# Patient Record
Sex: MALE | Race: WHITE
[De-identification: names, ages, dates, MRNs, and addresses within clinical notes are randomized per-mention and may not be internally consistent; named-entity substitution may affect disease eponyms.]

---

## 2019-04-29 ENCOUNTER — HOSPITAL ENCOUNTER (OUTPATIENT)
Dept: HOSPITAL 15 - RAD HDHVI | Age: 84
Discharge: HOME | End: 2019-04-29
Attending: INTERNAL MEDICINE
Payer: COMMERCIAL

## 2019-04-29 VITALS — DIASTOLIC BLOOD PRESSURE: 61 MMHG | SYSTOLIC BLOOD PRESSURE: 146 MMHG

## 2019-04-29 VITALS — DIASTOLIC BLOOD PRESSURE: 89 MMHG | SYSTOLIC BLOOD PRESSURE: 149 MMHG

## 2019-04-29 DIAGNOSIS — E04.1: ICD-10-CM

## 2019-04-29 DIAGNOSIS — J98.11: ICD-10-CM

## 2019-04-29 DIAGNOSIS — I25.10: Primary | ICD-10-CM

## 2019-04-29 DIAGNOSIS — I11.9: ICD-10-CM

## 2019-04-29 DIAGNOSIS — C49.A0: ICD-10-CM

## 2019-04-29 DIAGNOSIS — J90: ICD-10-CM

## 2019-04-29 PROCEDURE — 36415 COLL VENOUS BLD VENIPUNCTURE: CPT

## 2019-04-29 PROCEDURE — 71260 CT THORAX DX C+: CPT

## 2019-04-29 PROCEDURE — 82565 ASSAY OF CREATININE: CPT

## 2019-04-29 NOTE — NUR
IV removal

IV DC'd with sterile technique, catheter fully intact. Pressure dressing applied to site. 
Patient tolerated procedure well.  Discharged with aftercare instructions per MD.

NOTE:

## 2019-04-29 NOTE — NUR
IV insertion

IV access obtained, via clean sterile technique by inserting  gauge catheter at  after  
attempt(s). IV secured properly. No trauma to site. Patient tolerated procedure well.

## 2019-04-29 NOTE — NUR
Discharge Instructions

See e-MAR for any mediations given with this visit.  Patient education given on disease 
process. Patient verbalized understanding.  Previous labs reviewed.  Patient discharged in 
stable condition with after care instructions and follow up appointment.



MEDICATION



1415  ML IV X 1

## 2019-04-29 NOTE — NUR
[facility]

PT ARRIVED AT [facility] FOR [facility]A CHEST.ABD R/O POSSIBLE CA POST HOSPITALIZATION WITH PLEURAL 
EFFUSIONS.  LABS DRAWN, V/S OBTAINED , MD UPDATED ORDERS RECEIVED AND NOTED.

## 2020-06-11 ENCOUNTER — HOSPITAL ENCOUNTER (INPATIENT)
Dept: HOSPITAL 15 - ER | Age: 85
LOS: 7 days | Discharge: HOSPICE | DRG: 871 | End: 2020-06-18
Attending: INTERNAL MEDICINE | Admitting: INTERNAL MEDICINE
Payer: COMMERCIAL

## 2020-06-11 VITALS — WEIGHT: 199.52 LBS | BODY MASS INDEX: 27.02 KG/M2 | HEIGHT: 72 IN

## 2020-06-11 DIAGNOSIS — Z85.00: ICD-10-CM

## 2020-06-11 DIAGNOSIS — D64.9: ICD-10-CM

## 2020-06-11 DIAGNOSIS — I13.0: ICD-10-CM

## 2020-06-11 DIAGNOSIS — G93.41: ICD-10-CM

## 2020-06-11 DIAGNOSIS — I73.9: ICD-10-CM

## 2020-06-11 DIAGNOSIS — Z66: ICD-10-CM

## 2020-06-11 DIAGNOSIS — Z79.899: ICD-10-CM

## 2020-06-11 DIAGNOSIS — N17.9: ICD-10-CM

## 2020-06-11 DIAGNOSIS — Z91.041: ICD-10-CM

## 2020-06-11 DIAGNOSIS — E16.2: ICD-10-CM

## 2020-06-11 DIAGNOSIS — D47.3: ICD-10-CM

## 2020-06-11 DIAGNOSIS — Z51.5: ICD-10-CM

## 2020-06-11 DIAGNOSIS — N18.9: ICD-10-CM

## 2020-06-11 DIAGNOSIS — J18.9: ICD-10-CM

## 2020-06-11 DIAGNOSIS — A41.9: Primary | ICD-10-CM

## 2020-06-11 DIAGNOSIS — I50.32: ICD-10-CM

## 2020-06-11 DIAGNOSIS — K56.7: ICD-10-CM

## 2020-06-11 DIAGNOSIS — E43: ICD-10-CM

## 2020-06-11 LAB
ALBUMIN SERPL-MCNC: 1.5 G/DL (ref 3.4–5)
ALP SERPL-CCNC: 119 U/L (ref 45–117)
ALT SERPL-CCNC: < 6 U/L (ref 16–61)
ANION GAP SERPL CALCULATED.3IONS-SCNC: 12 MMOL/L (ref 5–15)
APTT PPP: 25.9 SEC (ref 23.64–32.05)
BILIRUB SERPL-MCNC: 0.3 MG/DL (ref 0.2–1)
BUN SERPL-MCNC: 59 MG/DL (ref 7–18)
BUN/CREAT SERPL: 29.6
CALCIUM SERPL-MCNC: 8.1 MG/DL (ref 8.5–10.1)
CHLORIDE SERPL-SCNC: 110 MMOL/L (ref 98–107)
CO2 SERPL-SCNC: 15 MMOL/L (ref 21–32)
CRYSTALS UR QL AUTO: (no result) /HPF
GLUCOSE SERPL-MCNC: 71 MG/DL (ref 74–106)
HCT VFR BLD AUTO: 29.2 % (ref 41–53)
HGB BLD-MCNC: 9.1 G/DL (ref 13.5–17.5)
INR PPP: 1.19 (ref 0.9–1.15)
MCH RBC QN AUTO: 25 PG (ref 28–32)
MCV RBC AUTO: 79.7 FL (ref 80–100)
NRBC BLD QL AUTO: 0.1 %
POTASSIUM SERPL-SCNC: 5 MMOL/L (ref 3.5–5.1)
PROT SERPL-MCNC: 5.1 G/DL (ref 6.4–8.2)
SODIUM SERPL-SCNC: 137 MMOL/L (ref 136–145)

## 2020-06-11 PROCEDURE — 74176 CT ABD & PELVIS W/O CONTRAST: CPT

## 2020-06-11 PROCEDURE — 83036 HEMOGLOBIN GLYCOSYLATED A1C: CPT

## 2020-06-11 PROCEDURE — 87493 C DIFF AMPLIFIED PROBE: CPT

## 2020-06-11 PROCEDURE — 85027 COMPLETE CBC AUTOMATED: CPT

## 2020-06-11 PROCEDURE — 83605 ASSAY OF LACTIC ACID: CPT

## 2020-06-11 PROCEDURE — 83735 ASSAY OF MAGNESIUM: CPT

## 2020-06-11 PROCEDURE — 87045 FECES CULTURE AEROBIC BACT: CPT

## 2020-06-11 PROCEDURE — 71250 CT THORAX DX C-: CPT

## 2020-06-11 PROCEDURE — 84443 ASSAY THYROID STIM HORMONE: CPT

## 2020-06-11 PROCEDURE — 85007 BL SMEAR W/DIFF WBC COUNT: CPT

## 2020-06-11 PROCEDURE — 87086 URINE CULTURE/COLONY COUNT: CPT

## 2020-06-11 PROCEDURE — 83880 ASSAY OF NATRIURETIC PEPTIDE: CPT

## 2020-06-11 PROCEDURE — 87081 CULTURE SCREEN ONLY: CPT

## 2020-06-11 PROCEDURE — 83540 ASSAY OF IRON: CPT

## 2020-06-11 PROCEDURE — 71045 X-RAY EXAM CHEST 1 VIEW: CPT

## 2020-06-11 PROCEDURE — 74018 RADEX ABDOMEN 1 VIEW: CPT

## 2020-06-11 PROCEDURE — 93005 ELECTROCARDIOGRAM TRACING: CPT

## 2020-06-11 PROCEDURE — 87040 BLOOD CULTURE FOR BACTERIA: CPT

## 2020-06-11 PROCEDURE — 84484 ASSAY OF TROPONIN QUANT: CPT

## 2020-06-11 PROCEDURE — 85730 THROMBOPLASTIN TIME PARTIAL: CPT

## 2020-06-11 PROCEDURE — 36415 COLL VENOUS BLD VENIPUNCTURE: CPT

## 2020-06-11 PROCEDURE — 85610 PROTHROMBIN TIME: CPT

## 2020-06-11 PROCEDURE — 85045 AUTOMATED RETICULOCYTE COUNT: CPT

## 2020-06-11 PROCEDURE — 82805 BLOOD GASES W/O2 SATURATION: CPT

## 2020-06-11 PROCEDURE — 80048 BASIC METABOLIC PNL TOTAL CA: CPT

## 2020-06-11 PROCEDURE — 36600 WITHDRAWAL OF ARTERIAL BLOOD: CPT

## 2020-06-11 PROCEDURE — 80053 COMPREHEN METABOLIC PANEL: CPT

## 2020-06-11 PROCEDURE — 81001 URINALYSIS AUTO W/SCOPE: CPT

## 2020-06-11 PROCEDURE — 83550 IRON BINDING TEST: CPT

## 2020-06-11 PROCEDURE — 87427 SHIGA-LIKE TOXIN AG IA: CPT

## 2020-06-11 PROCEDURE — 82962 GLUCOSE BLOOD TEST: CPT

## 2020-06-11 PROCEDURE — 80202 ASSAY OF VANCOMYCIN: CPT

## 2020-06-11 PROCEDURE — 73060 X-RAY EXAM OF HUMERUS: CPT

## 2020-06-11 PROCEDURE — 85025 COMPLETE CBC W/AUTO DIFF WBC: CPT

## 2020-06-11 RX ADMIN — Medication SCH STRIP: at 22:20

## 2020-06-11 RX ADMIN — PIPERACILLIN SODIUM AND TAZOBACTAM SODIUM SCH MLS/HR: .5; 4 INJECTION, POWDER, LYOPHILIZED, FOR SOLUTION INTRAVENOUS at 22:20

## 2020-06-11 RX ADMIN — Medication SCH STRIP: at 20:58

## 2020-06-12 VITALS — SYSTOLIC BLOOD PRESSURE: 120 MMHG | DIASTOLIC BLOOD PRESSURE: 54 MMHG

## 2020-06-12 VITALS — DIASTOLIC BLOOD PRESSURE: 56 MMHG | SYSTOLIC BLOOD PRESSURE: 102 MMHG

## 2020-06-12 VITALS — DIASTOLIC BLOOD PRESSURE: 54 MMHG | SYSTOLIC BLOOD PRESSURE: 120 MMHG

## 2020-06-12 LAB
ALBUMIN SERPL-MCNC: 1.2 G/DL (ref 3.4–5)
ALP SERPL-CCNC: 96 U/L (ref 45–117)
ALT SERPL-CCNC: < 6 U/L (ref 16–61)
ANION GAP SERPL CALCULATED.3IONS-SCNC: 9 MMOL/L (ref 5–15)
BILIRUB SERPL-MCNC: 0.2 MG/DL (ref 0.2–1)
BUN SERPL-MCNC: 52 MG/DL (ref 7–18)
BUN/CREAT SERPL: 26.8
CALCIUM SERPL-MCNC: 7.4 MG/DL (ref 8.5–10.1)
CHLORIDE SERPL-SCNC: 111 MMOL/L (ref 98–107)
CO2 SERPL-SCNC: 17 MMOL/L (ref 21–32)
GLUCOSE SERPL-MCNC: 122 MG/DL (ref 74–106)
HCT VFR BLD AUTO: 25.5 % (ref 41–53)
HGB BLD-MCNC: 8.3 G/DL (ref 13.5–17.5)
MCH RBC QN AUTO: 25.5 PG (ref 28–32)
MCV RBC AUTO: 78.6 FL (ref 80–100)
POTASSIUM SERPL-SCNC: 4.5 MMOL/L (ref 3.5–5.1)
PROT SERPL-MCNC: 4.6 G/DL (ref 6.4–8.2)
SODIUM SERPL-SCNC: 137 MMOL/L (ref 136–145)

## 2020-06-12 RX ADMIN — Medication SCH STRIP: at 00:06

## 2020-06-12 RX ADMIN — PIPERACILLIN SODIUM AND TAZOBACTAM SODIUM SCH MLS/HR: .5; 4 INJECTION, POWDER, LYOPHILIZED, FOR SOLUTION INTRAVENOUS at 22:14

## 2020-06-12 RX ADMIN — Medication SCH STRIP: at 06:04

## 2020-06-12 RX ADMIN — Medication SCH STRIP: at 06:00

## 2020-06-12 RX ADMIN — Medication SCH STRIP: at 22:00

## 2020-06-12 RX ADMIN — Medication SCH STRIP: at 12:08

## 2020-06-12 RX ADMIN — Medication SCH STRIP: at 04:44

## 2020-06-12 RX ADMIN — Medication SCH STRIP: at 18:13

## 2020-06-12 RX ADMIN — PIPERACILLIN SODIUM AND TAZOBACTAM SODIUM SCH MLS/HR: .5; 4 INJECTION, POWDER, LYOPHILIZED, FOR SOLUTION INTRAVENOUS at 06:04

## 2020-06-12 RX ADMIN — Medication SCH STRIP: at 02:39

## 2020-06-12 RX ADMIN — PIPERACILLIN SODIUM AND TAZOBACTAM SODIUM SCH MLS/HR: .5; 4 INJECTION, POWDER, LYOPHILIZED, FOR SOLUTION INTRAVENOUS at 15:49

## 2020-06-12 NOTE — NUR
WOUND CARE NOTE:

Wound care in to see patient per wound care request regarding multiple wounds/skin integrity 
issue that are noted present on admission.  Patient is 85 y/o male with admitting diagnosis 
of Sepsis, Septic Shock. Patient is resting in bed in Rm. 246A.  Patient is awake, alert and 
oriented to self. He's max assist in turning and repositioning and his Nicanor score is 12. 
Patient is in no stated pain at this time and he appears to be in no pain using Stewart Baker 
Faces Pain Scale. 



Skin assessment done with the assistance of patient's nurse, DORON Hector. Noted patient's  BL 
and BLE are edematous. Multiple non-blanchable redness (Stage 1 pressure injury noted) on 
patient's distal L medial foot, Rt heel,  Lt elbow, RT and Lt sacrum.  Patient is also 
incontinent of stool and passed moderate amount of loose stools. Maribel care given. Noted 
redness to his buttock, perirectal, perineum and inner thighs consistent with moisture 
associated dermatitis. Applied Z Guard cream to sacral, buttocks and perineum.  Complete 
linen changed. 



Intact erythema to edematous Lt lower leg also noted. Patient's Rt elbow noted with thick 
brown hyperpigmented, surrounding skin is pink, clean and dry, left open to air.  Intact 
ecchymosis noted on patient's posterior Rt upper arm and Lt forearm, no drainage/odor noted, 
left open to air. Photograph of patient's wounds/skin issue are taken for reference.  
Patient tolerated well. Repositioned patient for comfort. DORON Hector at bedside.



RECOMMENDATION:

Nursing to continue BID/PRN cleaning and application  of Z Guard cream to sacral, buttocks 
and perineum per MD order, Dietary consult,frequent turning and repositioning schedule as 
condition permits,  redistribute pressure points with pillows; air mattress (ordered), 
elevate heels on pillows; frequent mariebl care/check, keep clean and dry, continue monitoring 
by wound care while patient is hospitalized.

-------------------------------------------------------------------------------

Addendum: 06/12/20 at 1749 by Patrizia Felipe RN

-------------------------------------------------------------------------------

Amended: Links added.

## 2020-06-12 NOTE — NUR
Opening Shift Note

Assumed care of patient, lethargic, easily arousable. NGT to LIS.  No S/S of distress/SOB or 
pain.  Instructed on POC and to call for assist PRN, will continue to monitor for changes 
Q1hr and PRN.

## 2020-06-12 NOTE — NUR
Telemetry admit from ER

OLIVERIOEYAD admitted to Telemetry unit after SBAR received.  Patient oriented to hollie ARMSTRONG RN, unit, room, bed, and unit policies regarding patient care and visiting hours. 
Patient now on continuous telemetry monitoring, tele box #7  and telemetry reading on 
arrival to unit is Sinus Rhythm 98bpm. Patient placed on bedside oxygen, weighed by bedscale 
and encouraged to call if they need something. All questions and concerns addressed, patient 
verbalized understanding.

## 2020-06-12 NOTE — NUR
AIR MATTRESS:

Air mattress ordered at [facility]. JEFFY 06/12/20 @ 2317. Reference #[id number]. Please call Anjel Mahan at [phone] if needed to follow up.

-------------------------------------------------------------------------------

Addendum: 06/12/20 at 1723 by Patrizia Felipe RN

-------------------------------------------------------------------------------

Amended: Links added.

## 2020-06-13 VITALS — SYSTOLIC BLOOD PRESSURE: 148 MMHG | DIASTOLIC BLOOD PRESSURE: 73 MMHG

## 2020-06-13 VITALS — SYSTOLIC BLOOD PRESSURE: 124 MMHG | DIASTOLIC BLOOD PRESSURE: 81 MMHG

## 2020-06-13 VITALS — SYSTOLIC BLOOD PRESSURE: 152 MMHG | DIASTOLIC BLOOD PRESSURE: 70 MMHG

## 2020-06-13 VITALS — DIASTOLIC BLOOD PRESSURE: 86 MMHG | SYSTOLIC BLOOD PRESSURE: 130 MMHG

## 2020-06-13 VITALS — DIASTOLIC BLOOD PRESSURE: 62 MMHG | SYSTOLIC BLOOD PRESSURE: 137 MMHG

## 2020-06-13 LAB
ALBUMIN SERPL-MCNC: 1.3 G/DL (ref 3.4–5)
ALP SERPL-CCNC: 97 U/L (ref 45–117)
ALT SERPL-CCNC: < 6 U/L (ref 16–61)
ANION GAP SERPL CALCULATED.3IONS-SCNC: 8 MMOL/L (ref 5–15)
BILIRUB SERPL-MCNC: 0.2 MG/DL (ref 0.2–1)
BUN SERPL-MCNC: 51 MG/DL (ref 7–18)
BUN/CREAT SERPL: 26.3
CALCIUM SERPL-MCNC: 7.5 MG/DL (ref 8.5–10.1)
CHLORIDE SERPL-SCNC: 115 MMOL/L (ref 98–107)
CO2 SERPL-SCNC: 17 MMOL/L (ref 21–32)
GLUCOSE SERPL-MCNC: 82 MG/DL (ref 74–106)
HCT VFR BLD AUTO: 27.9 % (ref 41–53)
HGB BLD-MCNC: 8.5 G/DL (ref 13.5–17.5)
MAGNESIUM SERPL-MCNC: 2.4 MG/DL (ref 1.6–2.6)
MCH RBC QN AUTO: 25.8 PG (ref 28–32)
MCV RBC AUTO: 85.1 FL (ref 80–100)
POTASSIUM SERPL-SCNC: 4.4 MMOL/L (ref 3.5–5.1)
PROT SERPL-MCNC: 4.9 G/DL (ref 6.4–8.2)
SODIUM SERPL-SCNC: 140 MMOL/L (ref 136–145)

## 2020-06-13 RX ADMIN — Medication SCH STRIP: at 18:14

## 2020-06-13 RX ADMIN — Medication SCH STRIP: at 12:09

## 2020-06-13 RX ADMIN — PIPERACILLIN SODIUM AND TAZOBACTAM SODIUM SCH MLS/HR: .5; 4 INJECTION, POWDER, LYOPHILIZED, FOR SOLUTION INTRAVENOUS at 14:08

## 2020-06-13 RX ADMIN — MORPHINE SULFATE PRN MG: 2 INJECTION, SOLUTION INTRAMUSCULAR; INTRAVENOUS at 21:33

## 2020-06-13 RX ADMIN — PIPERACILLIN SODIUM AND TAZOBACTAM SODIUM SCH MLS/HR: .5; 4 INJECTION, POWDER, LYOPHILIZED, FOR SOLUTION INTRAVENOUS at 05:42

## 2020-06-13 RX ADMIN — Medication SCH STRIP: at 05:42

## 2020-06-13 RX ADMIN — PIPERACILLIN SODIUM AND TAZOBACTAM SODIUM SCH MLS/HR: .375; 3 INJECTION, POWDER, LYOPHILIZED, FOR SOLUTION INTRAVENOUS at 20:27

## 2020-06-13 RX ADMIN — Medication SCH STRIP: at 21:33

## 2020-06-13 RX ADMIN — ONDANSETRON HYDROCHLORIDE PRN MG: 2 INJECTION, SOLUTION INTRAMUSCULAR; INTRAVENOUS at 21:34

## 2020-06-13 NOTE — NUR
Opening Shift Note

Assumed care of patient, patient lethargic but easily aroused and answered questions.  No 
S/S of distress/SOB or pain at this time. Patient in the lowest possible position with bed 
rails up x2 and call light within reach. Johnson intact and fluids running, no complications 
noted at this time. Instructed on POC and to call for assist PRN, will continue to monitor 
for changes Q1hr and PRN.

## 2020-06-13 NOTE — NUR
Patient complained of generalized pain of 6/10. Patient requested pain medications for 
bedtime. Will administer and continue to monitor.

## 2020-06-14 VITALS — DIASTOLIC BLOOD PRESSURE: 69 MMHG | SYSTOLIC BLOOD PRESSURE: 128 MMHG

## 2020-06-14 VITALS — DIASTOLIC BLOOD PRESSURE: 63 MMHG | SYSTOLIC BLOOD PRESSURE: 128 MMHG

## 2020-06-14 VITALS — DIASTOLIC BLOOD PRESSURE: 73 MMHG | SYSTOLIC BLOOD PRESSURE: 136 MMHG

## 2020-06-14 VITALS — SYSTOLIC BLOOD PRESSURE: 161 MMHG | DIASTOLIC BLOOD PRESSURE: 79 MMHG

## 2020-06-14 LAB
ANION GAP SERPL CALCULATED.3IONS-SCNC: 8 MMOL/L (ref 5–15)
BUN SERPL-MCNC: 48 MG/DL (ref 7–18)
BUN/CREAT SERPL: 24.4
CALCIUM SERPL-MCNC: 7.3 MG/DL (ref 8.5–10.1)
CHLORIDE SERPL-SCNC: 116 MMOL/L (ref 98–107)
CO2 SERPL-SCNC: 15 MMOL/L (ref 21–32)
GLUCOSE SERPL-MCNC: 71 MG/DL (ref 74–106)
HCT VFR BLD AUTO: 29.8 % (ref 41–53)
HGB BLD-MCNC: 9.2 G/DL (ref 13.5–17.5)
IRON SERPL-MCNC: 28 UG/DL (ref 65–175)
MCH RBC QN AUTO: 25.5 PG (ref 28–32)
MCV RBC AUTO: 83 FL (ref 80–100)
POTASSIUM SERPL-SCNC: 4.3 MMOL/L (ref 3.5–5.1)
SODIUM SERPL-SCNC: 139 MMOL/L (ref 136–145)
TIBC SERPL-MCNC: 77 UG/DL (ref 250–450)

## 2020-06-14 RX ADMIN — MORPHINE SULFATE PRN MG: 2 INJECTION, SOLUTION INTRAMUSCULAR; INTRAVENOUS at 21:05

## 2020-06-14 RX ADMIN — PIPERACILLIN SODIUM AND TAZOBACTAM SODIUM SCH MLS/HR: .375; 3 INJECTION, POWDER, LYOPHILIZED, FOR SOLUTION INTRAVENOUS at 19:24

## 2020-06-14 RX ADMIN — Medication SCH STRIP: at 12:26

## 2020-06-14 RX ADMIN — ONDANSETRON HYDROCHLORIDE PRN MG: 2 INJECTION, SOLUTION INTRAMUSCULAR; INTRAVENOUS at 21:06

## 2020-06-14 RX ADMIN — Medication SCH STRIP: at 17:46

## 2020-06-14 RX ADMIN — PIPERACILLIN SODIUM AND TAZOBACTAM SODIUM SCH MLS/HR: .375; 3 INJECTION, POWDER, LYOPHILIZED, FOR SOLUTION INTRAVENOUS at 14:21

## 2020-06-14 RX ADMIN — Medication SCH STRIP: at 21:05

## 2020-06-14 RX ADMIN — PIPERACILLIN SODIUM AND TAZOBACTAM SODIUM SCH MLS/HR: .375; 3 INJECTION, POWDER, LYOPHILIZED, FOR SOLUTION INTRAVENOUS at 08:00

## 2020-06-14 RX ADMIN — PIPERACILLIN SODIUM AND TAZOBACTAM SODIUM SCH MLS/HR: .375; 3 INJECTION, POWDER, LYOPHILIZED, FOR SOLUTION INTRAVENOUS at 02:00

## 2020-06-14 RX ADMIN — Medication SCH STRIP: at 05:17

## 2020-06-14 NOTE — NUR
IV removal

IV to right hand found to be infiltrated with severe pitting edema. IV discontinued with 
clean technique, catheter fully intact. Pressure dressing applied to site. Patient tolerated 
well.

## 2020-06-14 NOTE — NUR
Nutrition Assessment Note 



PLEASE SEE ATTACHED LINK FOR COMPLETE ASSESSMENT



Est Energy needs BW 94 [id number] kcals (23-25 kcal/kgBW), Est Protein needs:  
gms/day (1.0-1.2 gm/kgBW r/t hypoalb). Will continue to monitor and reassess prn.




-------------------------------------------------------------------------------

Addendum: 20 at 1426 by Marilynn Henderson RD

-------------------------------------------------------------------------------

Amended: Links added.

## 2020-06-14 NOTE — NUR
OPENING SHIFT NOTE

ASSUMED CARE OF PATIENT AWAKE AND ALERT X2. NO S/S OF DISTRESS NOTED AND PATIENT HAS NO 
COMPLAINTS OF PAIN. UPDATED ON POC FOR THE DAY AND ALL QUESTIONS ANSWERED. BED IS IN LOWEST, 
LOCKED POSITION WITH SIDE RAILS UP X2, CALL LIGHT WITHIN REACH, AND BED ALARM ON FOR SAFETY. 
WILL CONTINUE TO MONITOR Q1H AND PRN.

## 2020-06-14 NOTE — NUR
Opening Shift Note

Assumed care of patient, awake and alert x 3.  No S/S of distress/SOB. Bed is in lowest 
position and locked. Call light within reach. Board updated. Tele box number matches monitor 
and leads are in correct placement. Bed alarm on. Instructed on POC and to call for assist 
PRN, will continue to monitor for changes Q1hr and PRN.

## 2020-06-14 NOTE — NUR
Patient wanted me to examine his right arm.

Patient stated that he thought his right arm might be broken and would like an X-ray. When 
asked when the patient might have fallen or broken it, patient stated he fell several months 
ago. Upon examination, the right arm appeared to be more edematous than the left with 
weeping in the upper arm, a pulse was found and the patient able to move his right fingers. 
Capillary refill was <3 seconds. Patient was able to lift up his hand from the elbow as well 
as lift from the shoulder. Will continue to monitor and inform day shift of patients 
concerns.

## 2020-06-14 NOTE — NUR
IV insertion

IV access obtained, via clean technique by inserting a 22 gauge catheter into a vein in the 
left wrist after 2 attempts. IV secured properly. No trauma to site. Patient tolerated well.

## 2020-06-14 NOTE — NUR
Paged MD Escudero to notify him of patient's increasing GI problems.

Patient was advanced to clear liquid diet this morning after MD Escudero ordered NG tube to be 
removed. Patient ate clear liquids for lunch and had liquid BM today. However, patient is 
now unable or refuses to eat, his right upper and lower abdominal quadrants are hypoactive, 
and he is reporting abdominal pain 8 out of 10 in his generalized abdomen. He has also been 
vomiting small amounts of clear liquid diet, though no bile was noted in vomitus. Suction 
made available. Head of bed raised greater than 30 degrees. BP is also elevated (161/70). 
Gave morphine and will reassess BP later. Awaiting call back.

## 2020-06-15 VITALS — DIASTOLIC BLOOD PRESSURE: 79 MMHG | SYSTOLIC BLOOD PRESSURE: 143 MMHG

## 2020-06-15 VITALS — SYSTOLIC BLOOD PRESSURE: 140 MMHG | DIASTOLIC BLOOD PRESSURE: 66 MMHG

## 2020-06-15 VITALS — SYSTOLIC BLOOD PRESSURE: 136 MMHG | DIASTOLIC BLOOD PRESSURE: 67 MMHG

## 2020-06-15 VITALS — DIASTOLIC BLOOD PRESSURE: 75 MMHG | SYSTOLIC BLOOD PRESSURE: 157 MMHG

## 2020-06-15 RX ADMIN — PIPERACILLIN SODIUM AND TAZOBACTAM SODIUM SCH MLS/HR: .375; 3 INJECTION, POWDER, LYOPHILIZED, FOR SOLUTION INTRAVENOUS at 19:56

## 2020-06-15 RX ADMIN — MORPHINE SULFATE PRN MG: 2 INJECTION, SOLUTION INTRAMUSCULAR; INTRAVENOUS at 22:00

## 2020-06-15 RX ADMIN — DOBUTAMINE IN DEXTROSE SCH MLS/HR: 100 INJECTION, SOLUTION INTRAVENOUS at 13:46

## 2020-06-15 RX ADMIN — Medication SCH ML: at 18:18

## 2020-06-15 RX ADMIN — DEXTROSE SCH MLS/HR: 50 INJECTION, SOLUTION INTRAVENOUS at 18:18

## 2020-06-15 RX ADMIN — Medication SCH STRIP: at 18:18

## 2020-06-15 RX ADMIN — PIPERACILLIN SODIUM AND TAZOBACTAM SODIUM SCH MLS/HR: .375; 3 INJECTION, POWDER, LYOPHILIZED, FOR SOLUTION INTRAVENOUS at 13:46

## 2020-06-15 RX ADMIN — Medication SCH ML: at 13:44

## 2020-06-15 RX ADMIN — PIPERACILLIN SODIUM AND TAZOBACTAM SODIUM SCH MLS/HR: .375; 3 INJECTION, POWDER, LYOPHILIZED, FOR SOLUTION INTRAVENOUS at 02:24

## 2020-06-15 RX ADMIN — MORPHINE SULFATE PRN MG: 2 INJECTION, SOLUTION INTRAMUSCULAR; INTRAVENOUS at 04:57

## 2020-06-15 RX ADMIN — MORPHINE SULFATE PRN MG: 2 INJECTION, SOLUTION INTRAMUSCULAR; INTRAVENOUS at 13:46

## 2020-06-15 RX ADMIN — DEXTROSE SCH MLS/HR: 50 INJECTION, SOLUTION INTRAVENOUS at 13:44

## 2020-06-15 RX ADMIN — PIPERACILLIN SODIUM AND TAZOBACTAM SODIUM SCH MLS/HR: .375; 3 INJECTION, POWDER, LYOPHILIZED, FOR SOLUTION INTRAVENOUS at 08:39

## 2020-06-15 RX ADMIN — Medication SCH STRIP: at 05:05

## 2020-06-15 RX ADMIN — Medication SCH STRIP: at 11:41

## 2020-06-15 RX ADMIN — Medication SCH STRIP: at 21:59

## 2020-06-15 NOTE — NUR
Opening Shift Note

Assumed care of patient, awake, AAOx2.  No S/S of distress/SOB or pain.  On 2L oxygen via 
nasal cannula.  Patient on bedrest.  Bed in lowest locked position, side rails up x2, call 
light within reach.  Instructed on POC and to call for assist PRN, will continue to monitor 
for changes Q1hr and PRN.

## 2020-06-15 NOTE — NUR
3RD CALL TO RADIOLOGY

SPOKE TO [email or web address] AGAIN REGARDING KUB RESULTS. WAS AGAIN INFORMED THEY WILL CALL IT IN TO 
THE RADIOLOGIST AND IT PROBABLY ISN'T "TRANSFERRING OVER". WILL FOLLOW UP.

## 2020-06-15 NOTE — NUR
RADIOLOGY

PLACED CALL TO RADIOLOGY REQUESTING KUB FROM 0400 ON 6/13/20 TO BE READ. PER STAFF THEY WILL 
CALL IT IN TO RADIOLOGIST FOR ME.

## 2020-06-15 NOTE — NUR
Midline Placement:

Patient educated on need for midline placement.  All risks and benefits explained and all 
questions and concerns addresses prior to procedure.  18g/10cm midline inserted via L 
CEPHALIC vein using Ultrasound.  Sterile technique utilized.  Blood return obtained from 
SINGLE lumen and flushed easily with NS using proper technique.  Midline secured with saline 
lock; biodisc and occlusive dressing applied.  Primary RN notified.

Midline lot # [id number].

## 2020-06-16 VITALS — SYSTOLIC BLOOD PRESSURE: 126 MMHG | DIASTOLIC BLOOD PRESSURE: 63 MMHG

## 2020-06-16 VITALS — SYSTOLIC BLOOD PRESSURE: 120 MMHG | DIASTOLIC BLOOD PRESSURE: 60 MMHG

## 2020-06-16 VITALS — SYSTOLIC BLOOD PRESSURE: 129 MMHG | DIASTOLIC BLOOD PRESSURE: 70 MMHG

## 2020-06-16 VITALS — DIASTOLIC BLOOD PRESSURE: 90 MMHG | SYSTOLIC BLOOD PRESSURE: 120 MMHG

## 2020-06-16 VITALS — DIASTOLIC BLOOD PRESSURE: 65 MMHG | SYSTOLIC BLOOD PRESSURE: 138 MMHG

## 2020-06-16 LAB
ALBUMIN SERPL-MCNC: 1.4 G/DL (ref 3.4–5)
ALP SERPL-CCNC: 109 U/L (ref 45–117)
ALT SERPL-CCNC: 6 U/L (ref 16–61)
ANION GAP SERPL CALCULATED.3IONS-SCNC: 9 MMOL/L (ref 5–15)
BILIRUB SERPL-MCNC: 0.2 MG/DL (ref 0.2–1)
BUN SERPL-MCNC: 44 MG/DL (ref 7–18)
BUN/CREAT SERPL: 20.7
CALCIUM SERPL-MCNC: 7.2 MG/DL (ref 8.5–10.1)
CHLORIDE SERPL-SCNC: 115 MMOL/L (ref 98–107)
CO2 SERPL-SCNC: 18 MMOL/L (ref 21–32)
GLUCOSE SERPL-MCNC: 91 MG/DL (ref 74–106)
HCT VFR BLD AUTO: 25.8 % (ref 41–53)
HGB BLD-MCNC: 8.3 G/DL (ref 13.5–17.5)
MCH RBC QN AUTO: 25.8 PG (ref 28–32)
MCV RBC AUTO: 80.1 FL (ref 80–100)
POTASSIUM SERPL-SCNC: 3.9 MMOL/L (ref 3.5–5.1)
PROT SERPL-MCNC: 5.1 G/DL (ref 6.4–8.2)
SODIUM SERPL-SCNC: 142 MMOL/L (ref 136–145)

## 2020-06-16 RX ADMIN — LINEZOLID SCH MLS/HR: 600 INJECTION, SOLUTION INTRAVENOUS at 23:25

## 2020-06-16 RX ADMIN — PIPERACILLIN SODIUM AND TAZOBACTAM SODIUM SCH MLS/HR: .375; 3 INJECTION, POWDER, LYOPHILIZED, FOR SOLUTION INTRAVENOUS at 02:15

## 2020-06-16 RX ADMIN — Medication SCH STRIP: at 12:00

## 2020-06-16 RX ADMIN — Medication SCH STRIP: at 06:00

## 2020-06-16 RX ADMIN — MEROPENEM SCH MLS/HR: 500 INJECTION INTRAVENOUS at 14:45

## 2020-06-16 RX ADMIN — DEXTROSE SCH MLS/HR: 50 INJECTION, SOLUTION INTRAVENOUS at 02:14

## 2020-06-16 RX ADMIN — Medication SCH STRIP: at 17:57

## 2020-06-16 RX ADMIN — MORPHINE SULFATE PRN MG: 2 INJECTION, SOLUTION INTRAMUSCULAR; INTRAVENOUS at 02:15

## 2020-06-16 RX ADMIN — Medication SCH ML: at 12:00

## 2020-06-16 RX ADMIN — Medication SCH STRIP: at 22:02

## 2020-06-16 RX ADMIN — Medication SCH ML: at 08:47

## 2020-06-16 RX ADMIN — Medication SCH ML: at 17:57

## 2020-06-16 RX ADMIN — MORPHINE SULFATE PRN MG: 2 INJECTION, SOLUTION INTRAMUSCULAR; INTRAVENOUS at 22:02

## 2020-06-16 RX ADMIN — PIPERACILLIN SODIUM AND TAZOBACTAM SODIUM SCH MLS/HR: .375; 3 INJECTION, POWDER, LYOPHILIZED, FOR SOLUTION INTRAVENOUS at 08:46

## 2020-06-16 RX ADMIN — DEXTROSE SCH MLS/HR: 50 INJECTION, SOLUTION INTRAVENOUS at 03:30

## 2020-06-16 RX ADMIN — DEXTROSE SCH MLS/HR: 50 INJECTION, SOLUTION INTRAVENOUS at 23:33

## 2020-06-16 RX ADMIN — LINEZOLID SCH MLS/HR: 600 INJECTION, SOLUTION INTRAVENOUS at 11:20

## 2020-06-16 RX ADMIN — DOBUTAMINE IN DEXTROSE SCH MLS/HR: 100 INJECTION, SOLUTION INTRAVENOUS at 06:01

## 2020-06-16 RX ADMIN — DEXTROSE SCH MLS/HR: 50 INJECTION, SOLUTION INTRAVENOUS at 17:59

## 2020-06-16 RX ADMIN — DEXTROSE SCH MLS/HR: 50 INJECTION, SOLUTION INTRAVENOUS at 08:47

## 2020-06-16 RX ADMIN — DEXTROSE SCH MLS/HR: 50 INJECTION, SOLUTION INTRAVENOUS at 14:46

## 2020-06-16 NOTE — NUR
Nutrition Followup Note 



Wt 92.5kg 



Pt with inadequate po intake aeb pt with avg po intake of 23% x 3 days per RN doc.  Continue 
with Ensure Clear while pt on clear liquid diet, consider ensure enlive if pt diet advances 
and continues to have poor po intake.  



Est Energy needs BW 94 [id number] kcals (23-25 kcal/kgBW), Est Protein needs:  
gms/day (1.0-1.2 gm/kgBW r/t hypoalb). Will continue to monitor and reassess prn.



Labs:  BUN 44H, Creat 2.13H, Ca 7.2L, Alb 1.4L



BM: 2  per RN doc



Skin: BS 14 mod risk, full details in RN wound care doc. 



PES:   Altered nutrition related lab values r.t current chronic medical condition aeb elev 
RFT severe hypoalb



Comments 

1) advance diet as medically feasible

2) consider prostat 1 packet bid as RFt improve

3) continue current plan of care

Expected Outcomes/Goals: 

pt will advance and asael po

pt will have improved labs



f/u 2-3 days

## 2020-06-16 NOTE — NUR
assessment

Patient is a 86 year old male who is confused. Per patients wife Tresa prior to admission 
patient lived home with her and functioned with assistance from her and their son Willem. Per 
Tresa patient went to [facility] for 2 weeks in ICU for vomiting and diarrhea. 
Patient was discharged home worse than he was on admission per Tresa. Patients home health 
nurse came to see patient the next day and informed Tresa to take patient back to the 
hospital and patient was admitted here. Per Tresa patient has a fww, wheelchair, and 
bedside commode for home use. Patients PCP is Dr Klein. Patient may need home IV ABX on 
discharge. I informed Tresa I would continue to monitor for patients needs and will keep 
her posted. Tresa verbalized understanding. 

-------------------------------------------------------------------------------

Addendum: 06/16/20 at 1555 by Shanika BHATT

-------------------------------------------------------------------------------

Amended: Links added.

## 2020-06-17 VITALS — DIASTOLIC BLOOD PRESSURE: 77 MMHG | SYSTOLIC BLOOD PRESSURE: 121 MMHG

## 2020-06-17 VITALS — DIASTOLIC BLOOD PRESSURE: 72 MMHG | SYSTOLIC BLOOD PRESSURE: 126 MMHG

## 2020-06-17 VITALS — DIASTOLIC BLOOD PRESSURE: 61 MMHG | SYSTOLIC BLOOD PRESSURE: 125 MMHG

## 2020-06-17 VITALS — DIASTOLIC BLOOD PRESSURE: 77 MMHG | SYSTOLIC BLOOD PRESSURE: 139 MMHG

## 2020-06-17 VITALS — DIASTOLIC BLOOD PRESSURE: 80 MMHG | SYSTOLIC BLOOD PRESSURE: 145 MMHG

## 2020-06-17 RX ADMIN — Medication SCH STRIP: at 21:23

## 2020-06-17 RX ADMIN — DEXTROSE SCH MLS/HR: 50 INJECTION, SOLUTION INTRAVENOUS at 19:36

## 2020-06-17 RX ADMIN — MEROPENEM SCH MLS/HR: 500 INJECTION INTRAVENOUS at 03:11

## 2020-06-17 RX ADMIN — LINEZOLID SCH MLS/HR: 600 INJECTION, SOLUTION INTRAVENOUS at 23:01

## 2020-06-17 RX ADMIN — DEXTROSE SCH MLS/HR: 50 INJECTION, SOLUTION INTRAVENOUS at 14:48

## 2020-06-17 RX ADMIN — Medication SCH STRIP: at 11:58

## 2020-06-17 RX ADMIN — DEXTROSE SCH MLS/HR: 50 INJECTION, SOLUTION INTRAVENOUS at 09:30

## 2020-06-17 RX ADMIN — Medication SCH STRIP: at 05:45

## 2020-06-17 RX ADMIN — MEROPENEM SCH MLS/HR: 500 INJECTION INTRAVENOUS at 14:48

## 2020-06-17 RX ADMIN — Medication SCH ML: at 18:12

## 2020-06-17 RX ADMIN — DOBUTAMINE IN DEXTROSE SCH MLS/HR: 100 INJECTION, SOLUTION INTRAVENOUS at 01:00

## 2020-06-17 RX ADMIN — Medication SCH STRIP: at 18:12

## 2020-06-17 RX ADMIN — Medication SCH ML: at 11:58

## 2020-06-17 RX ADMIN — MORPHINE SULFATE PRN MG: 2 INJECTION, SOLUTION INTRAMUSCULAR; INTRAVENOUS at 19:35

## 2020-06-17 RX ADMIN — LINEZOLID SCH MLS/HR: 600 INJECTION, SOLUTION INTRAVENOUS at 10:50

## 2020-06-17 RX ADMIN — DOBUTAMINE IN DEXTROSE SCH MLS/HR: 100 INJECTION, SOLUTION INTRAVENOUS at 18:13

## 2020-06-17 RX ADMIN — Medication SCH ML: at 08:31

## 2020-06-17 RX ADMIN — DEXTROSE SCH MLS/HR: 50 INJECTION, SOLUTION INTRAVENOUS at 04:37

## 2020-06-17 NOTE — NUR
Veras was leaking - noted on wet pads.  Readjusted veras by deflating balloon, advancing 
catheter and reinflating.  Pulled to make sure it was snug.  Will continue to monitor.

## 2020-06-17 NOTE — NUR
Opening Shift Note

Received report on the patient. Awake lying in bed. Patient shows no signs of distress at 
this time. Discussed the plan of care with the patient. Bed in lowest position, side rails 
up x2, and the call light is within reach.

## 2020-06-17 NOTE — NUR
Opening Shift Note

Assumed care of patient, awake and alert.  No S/S of distress/SOB.  Instructed on POC and to 
call for assist PRN, will continue to monitor for changes Q1hr and PRN.

## 2020-06-18 VITALS — SYSTOLIC BLOOD PRESSURE: 122 MMHG | DIASTOLIC BLOOD PRESSURE: 65 MMHG

## 2020-06-18 VITALS — DIASTOLIC BLOOD PRESSURE: 73 MMHG | SYSTOLIC BLOOD PRESSURE: 126 MMHG

## 2020-06-18 VITALS — SYSTOLIC BLOOD PRESSURE: 130 MMHG | DIASTOLIC BLOOD PRESSURE: 74 MMHG

## 2020-06-18 LAB
ANION GAP SERPL CALCULATED.3IONS-SCNC: 8 MMOL/L (ref 5–15)
BUN SERPL-MCNC: 42 MG/DL (ref 7–18)
BUN/CREAT SERPL: 19.6
CALCIUM SERPL-MCNC: 6.5 MG/DL (ref 8.5–10.1)
CHLORIDE SERPL-SCNC: 110 MMOL/L (ref 98–107)
CO2 SERPL-SCNC: 23 MMOL/L (ref 21–32)
GLUCOSE SERPL-MCNC: 80 MG/DL (ref 74–106)
HCT VFR BLD AUTO: 26.4 % (ref 41–53)
HGB BLD-MCNC: 8.5 G/DL (ref 13.5–17.5)
MCH RBC QN AUTO: 25.1 PG (ref 28–32)
MCV RBC AUTO: 77.8 FL (ref 80–100)
POTASSIUM SERPL-SCNC: 3 MMOL/L (ref 3.5–5.1)
SODIUM SERPL-SCNC: 141 MMOL/L (ref 136–145)

## 2020-06-18 RX ADMIN — DEXTROSE SCH MLS/HR: 50 INJECTION, SOLUTION INTRAVENOUS at 00:59

## 2020-06-18 RX ADMIN — MEROPENEM SCH MLS/HR: 500 INJECTION INTRAVENOUS at 15:00

## 2020-06-18 RX ADMIN — MORPHINE SULFATE PRN MG: 2 INJECTION, SOLUTION INTRAMUSCULAR; INTRAVENOUS at 10:43

## 2020-06-18 RX ADMIN — MORPHINE SULFATE PRN MG: 2 INJECTION, SOLUTION INTRAMUSCULAR; INTRAVENOUS at 03:03

## 2020-06-18 RX ADMIN — Medication SCH STRIP: at 05:35

## 2020-06-18 RX ADMIN — Medication SCH ML: at 12:55

## 2020-06-18 RX ADMIN — DEXTROSE SCH MLS/HR: 50 INJECTION, SOLUTION INTRAVENOUS at 05:35

## 2020-06-18 RX ADMIN — LINEZOLID SCH MLS/HR: 600 INJECTION, SOLUTION INTRAVENOUS at 10:50

## 2020-06-18 RX ADMIN — MEROPENEM SCH MLS/HR: 500 INJECTION INTRAVENOUS at 03:02

## 2020-06-18 RX ADMIN — Medication SCH STRIP: at 12:55

## 2020-06-18 RX ADMIN — Medication SCH ML: at 08:37

## 2020-06-18 NOTE — NUR
D/C Planning 



Per  consult for Hospice evaluation. Faxed clinical information to [facility] 
as requested. Patient has been accepted and will be seen upon d/c day.Transportation has 
been arranged with [facility] between 14:00-17:00.  DORON Dowd has been informed.

## 2020-06-18 NOTE — NUR
Received a call from Cleopatra at [facility]. [facility] will be here between 
3-4. Cleopatra also told me to D/C the patient with the [facility].

## 2020-06-18 NOTE — NUR
Discharge instructions given as ordered. Encourage to follow up with PMD as instructed. All 
questions and concerns addressed. Patient verbalized understanding.  Medication 
reconciliation form completed and copy given to patient. IV removed with catheter intact, 
pressure dressing applied.  Telemetry unit returned to ICU. Patient taken to vehicle via 
gurney with all personal belongings, accompanied by staff. No distress noted at time of 
departure.

## 2023-03-15 NOTE — NUR
How Severe Are Your Spot(S)?: mild RADIOLOGY

PLACED CALL TO RADIOLOGY REQUESTING KUB BE READ. STAFF STATED THEY WILL CALL IT IN TO ON 
CALL RADIOLOGIST. What Is The Reason For Today's Visit?: Full Body Skin Examination What Is The Reason For Today's Visit? (Being Monitored For X): concerning skin lesions on an annual basis